# Patient Record
Sex: FEMALE | Race: WHITE | URBAN - METROPOLITAN AREA
[De-identification: names, ages, dates, MRNs, and addresses within clinical notes are randomized per-mention and may not be internally consistent; named-entity substitution may affect disease eponyms.]

---

## 2017-01-11 ENCOUNTER — OFFICE VISIT (OUTPATIENT)
Dept: SURGERY | Facility: CLINIC | Age: 67
End: 2017-01-11

## 2017-01-11 VITALS
HEIGHT: 62 IN | WEIGHT: 125.38 LBS | HEART RATE: 71 BPM | BODY MASS INDEX: 23.07 KG/M2 | DIASTOLIC BLOOD PRESSURE: 68 MMHG | TEMPERATURE: 99 F | SYSTOLIC BLOOD PRESSURE: 106 MMHG

## 2017-01-11 DIAGNOSIS — Z85.820 HISTORY OF MELANOMA: ICD-10-CM

## 2017-01-11 DIAGNOSIS — C44.722 SQUAMOUS CELL CARCINOMA OF RIGHT LOWER LEG: Primary | ICD-10-CM

## 2017-01-11 DIAGNOSIS — D03.59 MELANOMA IN SITU OF BACK (HCC): ICD-10-CM

## 2017-01-11 PROCEDURE — 99024 POSTOP FOLLOW-UP VISIT: CPT | Performed by: SURGERY

## 2017-01-12 NOTE — PROGRESS NOTES
[facility]    Patient Name:  Daniel Pfeiffer   YOB: 1950   Gender:  Female   Appt Date:  1/11/2017   Provider:  Tristan Bernheim, MD   Insurance:  [address] [place] [address]  Referring Provider: emmanuelle       CHIEF COMP Status: Former Smoker                   Packs/Day: 0.00  Years:           Quit date: 12/15/2013     Reviewed:  Family History   Problem Relation Age of Onset   • Cancer Brother      bladder cancer      Reviewed:       Review of Systems:  Patient reports fe

## (undated) NOTE — MR AVS SNAPSHOT
[facility]  [address] [place] [address], [address] [place] [address]                    After Visit Summary   1/11/2017    Lesley Dials    MRN: [id number]           Visit Information        Provider Department Dept Phone    1/11/20 Support Staff. Remember, MyChart is NOT to be used for urgent needs. For medical emergencies, dial 911.